# Patient Record
Sex: FEMALE | Race: WHITE | NOT HISPANIC OR LATINO | URBAN - NONMETROPOLITAN AREA
[De-identification: names, ages, dates, MRNs, and addresses within clinical notes are randomized per-mention and may not be internally consistent; named-entity substitution may affect disease eponyms.]

---

## 2018-10-18 ENCOUNTER — APPOINTMENT (RX ONLY)
Dept: URBAN - NONMETROPOLITAN AREA CLINIC 13 | Facility: CLINIC | Age: 58
Setting detail: DERMATOLOGY
End: 2018-10-18

## 2018-10-18 DIAGNOSIS — L40.0 PSORIASIS VULGARIS: ICD-10-CM | Status: INADEQUATELY CONTROLLED

## 2018-10-18 PROBLEM — E05.90 THYROTOXICOSIS, UNSPECIFIED WITHOUT THYROTOXIC CRISIS OR STORM: Status: ACTIVE | Noted: 2018-10-18

## 2018-10-18 PROBLEM — J30.1 ALLERGIC RHINITIS DUE TO POLLEN: Status: ACTIVE | Noted: 2018-10-18

## 2018-10-18 PROBLEM — M12.9 ARTHROPATHY, UNSPECIFIED: Status: ACTIVE | Noted: 2018-10-18

## 2018-10-18 PROBLEM — L29.8 OTHER PRURITUS: Status: ACTIVE | Noted: 2018-10-18

## 2018-10-18 PROBLEM — L85.3 XEROSIS CUTIS: Status: ACTIVE | Noted: 2018-10-18

## 2018-10-18 PROBLEM — F32.9 MAJOR DEPRESSIVE DISORDER, SINGLE EPISODE, UNSPECIFIED: Status: ACTIVE | Noted: 2018-10-18

## 2018-10-18 PROBLEM — E78.5 HYPERLIPIDEMIA, UNSPECIFIED: Status: ACTIVE | Noted: 2018-10-18

## 2018-10-18 PROBLEM — E03.9 HYPOTHYROIDISM, UNSPECIFIED: Status: ACTIVE | Noted: 2018-10-18

## 2018-10-18 PROCEDURE — ? PRESCRIPTION

## 2018-10-18 PROCEDURE — 99202 OFFICE O/P NEW SF 15 MIN: CPT

## 2018-10-18 PROCEDURE — ? REFERRAL

## 2018-10-18 PROCEDURE — ? COUNSELING

## 2018-10-18 RX ORDER — KETOCONAZOLE 20 MG/G
CREAM TOPICAL
Qty: 1 | Refills: 0 | Status: ERX | COMMUNITY
Start: 2018-10-18

## 2018-10-18 RX ORDER — HYDROCORTISONE 25 MG/G
CREAM TOPICAL BID
Qty: 1 | Refills: 0 | Status: ERX | COMMUNITY
Start: 2018-10-18

## 2018-10-18 RX ORDER — CLOBETASOL PROPIONATE 0.5 MG/ML
SOLUTION TOPICAL NIGHTLY
Qty: 50 | Refills: 0 | Status: ERX | COMMUNITY
Start: 2018-10-18

## 2018-10-18 RX ADMIN — KETOCONAZOLE: 20 CREAM TOPICAL at 16:58

## 2018-10-18 RX ADMIN — HYDROCORTISONE: 25 CREAM TOPICAL at 16:57

## 2018-10-18 RX ADMIN — CLOBETASOL PROPIONATE: 0.5 SOLUTION TOPICAL at 16:49

## 2018-10-18 ASSESSMENT — LOCATION DETAILED DESCRIPTION DERM
LOCATION DETAILED: LEFT ANTERIOR PROXIMAL THIGH
LOCATION DETAILED: MID-OCCIPITAL SCALP

## 2018-10-18 ASSESSMENT — LOCATION ZONE DERM
LOCATION ZONE: LEG
LOCATION ZONE: SCALP

## 2018-10-18 ASSESSMENT — LOCATION SIMPLE DESCRIPTION DERM
LOCATION SIMPLE: POSTERIOR SCALP
LOCATION SIMPLE: LEFT THIGH

## 2018-10-18 NOTE — PROCEDURE: MIPS QUALITY
Quality 111:Pneumonia Vaccination Status For Older Adults: Pneumococcal Vaccination Previously Received
Detail Level: Detailed
Quality 226: Preventive Care And Screening: Tobacco Use: Screening And Cessation Intervention: Patient screened for tobacco and is an ex-smoker

## 2018-10-18 NOTE — PROCEDURE: COUNSELING
Detail Level: Detailed
Patient Specific Counseling (Will Not Stick From Patient To Patient): -referral to rheumatology [facility] for evaluation and treatment of psoriatic arthritis \\n-Clobetasol scalp solution 0.05% apply to affected area of the scalp nightly prn.\\n-Inverse psoriasis in the R inguinal fold, Hydrocortisone 2.5% BID prn and Ketoconazole 2% cream BID prn\\n-follow up in 6 weeks

## 2018-10-18 NOTE — HPI: RASH (PSORIASIS)
How Severe Is Your Psoriasis?: mild
Is This A New Presentation, Or A Follow-Up?: Psoriasis
Additional History: She has been on Humira, Methotrexate, Enbrel, inflixumab, off therapy x 4 years, no prominent joint pain. Now with recurrences of Arthralgias and worsening of skin psoriasis on the scalp.